# Patient Record
Sex: FEMALE | Race: WHITE | NOT HISPANIC OR LATINO
[De-identification: names, ages, dates, MRNs, and addresses within clinical notes are randomized per-mention and may not be internally consistent; named-entity substitution may affect disease eponyms.]

---

## 2024-07-05 ENCOUNTER — NON-APPOINTMENT (OUTPATIENT)
Age: 59
End: 2024-07-05

## 2024-07-05 ENCOUNTER — APPOINTMENT (OUTPATIENT)
Dept: FAMILY MEDICINE | Facility: CLINIC | Age: 59
End: 2024-07-05
Payer: MEDICAID

## 2024-07-05 VITALS
BODY MASS INDEX: 23.34 KG/M2 | SYSTOLIC BLOOD PRESSURE: 130 MMHG | TEMPERATURE: 97.7 F | HEART RATE: 83 BPM | RESPIRATION RATE: 17 BRPM | WEIGHT: 154 LBS | DIASTOLIC BLOOD PRESSURE: 82 MMHG | HEIGHT: 68 IN | OXYGEN SATURATION: 96 %

## 2024-07-05 DIAGNOSIS — R10.9 UNSPECIFIED ABDOMINAL PAIN: ICD-10-CM

## 2024-07-05 DIAGNOSIS — Z00.00 ENCOUNTER FOR GENERAL ADULT MEDICAL EXAMINATION W/OUT ABNORMAL FINDINGS: ICD-10-CM

## 2024-07-05 DIAGNOSIS — C53.9 MALIGNANT NEOPLASM OF CERVIX UTERI, UNSPECIFIED: ICD-10-CM

## 2024-07-05 DIAGNOSIS — R63.4 ABNORMAL WEIGHT LOSS: ICD-10-CM

## 2024-07-05 DIAGNOSIS — Z82.49 FAMILY HISTORY OF ISCHEMIC HEART DISEASE AND OTHER DISEASES OF THE CIRCULATORY SYSTEM: ICD-10-CM

## 2024-07-05 DIAGNOSIS — F41.9 ANXIETY DISORDER, UNSPECIFIED: ICD-10-CM

## 2024-07-05 DIAGNOSIS — Z80.49 FAMILY HISTORY OF MALIGNANT NEOPLASM OF OTHER GENITAL ORGANS: ICD-10-CM

## 2024-07-05 DIAGNOSIS — Z87.898 PERSONAL HISTORY OF OTHER SPECIFIED CONDITIONS: ICD-10-CM

## 2024-07-05 DIAGNOSIS — Z80.0 FAMILY HISTORY OF MALIGNANT NEOPLASM OF DIGESTIVE ORGANS: ICD-10-CM

## 2024-07-05 PROCEDURE — 99205 OFFICE O/P NEW HI 60 MIN: CPT | Mod: 25

## 2024-07-05 PROCEDURE — 93000 ELECTROCARDIOGRAM COMPLETE: CPT

## 2024-07-05 RX ORDER — ALBUTEROL SULFATE 2.5 MG/3ML
(2.5 MG/3ML) SOLUTION RESPIRATORY (INHALATION)
Refills: 0 | Status: ACTIVE | COMMUNITY
Start: 2024-07-05

## 2024-07-05 RX ORDER — ALPRAZOLAM 0.25 MG/1
0.25 TABLET ORAL
Qty: 14 | Refills: 0 | Status: ACTIVE | COMMUNITY
Start: 2024-07-05 | End: 1900-01-01

## 2024-07-09 ENCOUNTER — NON-APPOINTMENT (OUTPATIENT)
Age: 59
End: 2024-07-09

## 2024-07-09 VITALS — HEIGHT: 68 IN | BODY MASS INDEX: 23.34 KG/M2 | WEIGHT: 154 LBS

## 2024-07-09 DIAGNOSIS — F17.200 NICOTINE DEPENDENCE, UNSPECIFIED, UNCOMPLICATED: ICD-10-CM

## 2024-07-09 DIAGNOSIS — J44.9 CHRONIC OBSTRUCTIVE PULMONARY DISEASE, UNSPECIFIED: ICD-10-CM

## 2024-07-22 ENCOUNTER — TRANSCRIPTION ENCOUNTER (OUTPATIENT)
Age: 59
End: 2024-07-22

## 2024-07-23 ENCOUNTER — TRANSCRIPTION ENCOUNTER (OUTPATIENT)
Age: 59
End: 2024-07-23

## 2024-07-24 RX ORDER — NICOTINE 21 MG/24HR
14 PATCH, TRANSDERMAL 24 HOURS TRANSDERMAL DAILY
Refills: 0 | Status: ACTIVE | COMMUNITY
Start: 2024-07-24

## 2024-07-24 RX ORDER — ALBUTEROL SULFATE 90 UG/1
108 (90 BASE) INHALANT RESPIRATORY (INHALATION)
Qty: 1 | Refills: 3 | Status: ACTIVE | COMMUNITY
Start: 2024-07-24

## 2024-07-24 RX ORDER — FOLIC ACID 1 MG/1
1 TABLET ORAL DAILY
Refills: 0 | Status: ACTIVE | COMMUNITY
Start: 2024-07-24

## 2024-07-24 RX ORDER — LEVOFLOXACIN 750 MG/1
750 TABLET, FILM COATED ORAL DAILY
Refills: 0 | Status: ACTIVE | COMMUNITY
Start: 2024-07-24

## 2024-07-24 RX ORDER — BUDESONIDE AND FORMOTEROL FUMARATE DIHYDRATE 160; 4.5 UG/1; UG/1
160-4.5 AEROSOL RESPIRATORY (INHALATION)
Qty: 1 | Refills: 3 | Status: ACTIVE | COMMUNITY
Start: 2024-07-24

## 2024-07-24 RX ORDER — ELECTROLYTES/DEXTROSE
SOLUTION, ORAL ORAL DAILY
Refills: 0 | Status: ACTIVE | COMMUNITY
Start: 2024-07-24

## 2024-07-26 ENCOUNTER — APPOINTMENT (OUTPATIENT)
Dept: CT IMAGING | Facility: HOSPITAL | Age: 59
End: 2024-07-26

## 2024-07-26 ENCOUNTER — APPOINTMENT (OUTPATIENT)
Dept: ULTRASOUND IMAGING | Facility: HOSPITAL | Age: 59
End: 2024-07-26

## 2024-07-26 PROCEDURE — 71271 CT THORAX LUNG CANCER SCR C-: CPT | Mod: 26

## 2024-07-29 ENCOUNTER — APPOINTMENT (OUTPATIENT)
Dept: FAMILY MEDICINE | Facility: CLINIC | Age: 59
End: 2024-07-29
Payer: MEDICAID

## 2024-07-29 ENCOUNTER — TRANSCRIPTION ENCOUNTER (OUTPATIENT)
Age: 59
End: 2024-07-29

## 2024-07-29 VITALS
RESPIRATION RATE: 16 BRPM | HEART RATE: 92 BPM | TEMPERATURE: 97.1 F | DIASTOLIC BLOOD PRESSURE: 72 MMHG | WEIGHT: 156 LBS | BODY MASS INDEX: 23.64 KG/M2 | HEIGHT: 68 IN | OXYGEN SATURATION: 97 % | SYSTOLIC BLOOD PRESSURE: 122 MMHG

## 2024-07-29 DIAGNOSIS — J18.9 PNEUMONIA, UNSPECIFIED ORGANISM: ICD-10-CM

## 2024-07-29 DIAGNOSIS — J44.9 CHRONIC OBSTRUCTIVE PULMONARY DISEASE, UNSPECIFIED: ICD-10-CM

## 2024-07-29 DIAGNOSIS — K76.0 FATTY (CHANGE OF) LIVER, NOT ELSEWHERE CLASSIFIED: ICD-10-CM

## 2024-07-29 PROCEDURE — 99496 TRANSJ CARE MGMT HIGH F2F 7D: CPT

## 2024-07-29 NOTE — ASSESSMENT
[FreeTextEntry1] : Overall symptoms of pneumonia has been improving.  Continue with ICS/LABA.  To follow-up with pulmonology  Discussed alcohol cessation and healthy lifestyle for treatment of fatty liver.  For further evaluation due to chronic EtOH use will refer to GI to consider FibroScan

## 2024-07-29 NOTE — PHYSICAL EXAM
[No Acute Distress] : no acute distress [Well Nourished] : well nourished [Well Developed] : well developed [Well-Appearing] : well-appearing [Normal Sclera/Conjunctiva] : normal sclera/conjunctiva [PERRL] : pupils equal round and reactive to light [EOMI] : extraocular movements intact [Normal Outer Ear/Nose] : the outer ears and nose were normal in appearance [Normal Oropharynx] : the oropharynx was normal [No JVD] : no jugular venous distention [No Lymphadenopathy] : no lymphadenopathy [Supple] : supple [Thyroid Normal, No Nodules] : the thyroid was normal and there were no nodules present [No Respiratory Distress] : no respiratory distress  [No Accessory Muscle Use] : no accessory muscle use [Clear to Auscultation] : lungs were clear to auscultation bilaterally [Normal Rate] : normal rate  [Regular Rhythm] : with a regular rhythm [Normal S1, S2] : normal S1 and S2 [No Murmur] : no murmur heard [No Carotid Bruits] : no carotid bruits [No Abdominal Bruit] : a ~M bruit was not heard ~T in the abdomen [No Varicosities] : no varicosities [Pedal Pulses Present] : the pedal pulses are present [No Edema] : there was no peripheral edema [No Palpable Aorta] : no palpable aorta [No Extremity Clubbing/Cyanosis] : no extremity clubbing/cyanosis [Soft] : abdomen soft [Non Tender] : non-tender [Non-distended] : non-distended [No Masses] : no abdominal mass palpated [No HSM] : no HSM [Normal Bowel Sounds] : normal bowel sounds [Normal Posterior Cervical Nodes] : no posterior cervical lymphadenopathy [Normal Anterior Cervical Nodes] : no anterior cervical lymphadenopathy [No CVA Tenderness] : no CVA  tenderness [No Spinal Tenderness] : no spinal tenderness [No Joint Swelling] : no joint swelling [Grossly Normal Strength/Tone] : grossly normal strength/tone [No Rash] : no rash [Coordination Grossly Intact] : coordination grossly intact [No Focal Deficits] : no focal deficits [Normal Gait] : normal gait [Deep Tendon Reflexes (DTR)] : deep tendon reflexes were 2+ and symmetric [Normal Affect] : the affect was normal [Normal Insight/Judgement] : insight and judgment were intact [33832 - High Complexity requires an extensive number of possible diagnoses and/or the management options, extensive complexity of the medical data (tests, etc.) to be reviewed, and a high risk of significant complications, morbidity, and/or mortality as w] : High Complexity

## 2024-07-29 NOTE — HISTORY OF PRESENT ILLNESS
[Post-hospitalization from ___ Hospital] : Post-hospitalization from [unfilled] Hospital [Admitted on: ___] : The patient was admitted on [unfilled] [Discharged on ___] : discharged on [unfilled] [Discharge Summary] : discharge summary [Pertinent Labs] : pertinent labs [Radiology Findings] : radiology findings [Discharge Med List] : discharge medication list [Med Reconciliation] : medication reconciliation has been completed [FreeTextEntry2] : Discharge Date 23-Jul-2024 Admission Date 21-Jul-2024 16:20 Reason for Admission aspiration pneumonia, sepsis Hospital Course Hospital Course HPI: 59 yrs old with h/o COPD, current smoker, excessive use of ETOH, came to ER with one day h/o worsening SOB, chest pain radiating to right arm and back with numbness over the left arm. pain is mild to moderate, constant since morning . + cough with greenish sputum. no flu like symptoms. denied presyncope, syncope, dizziness, palpitation. + RUQ abdominal pain for which she was scheduled for RUQ USG on coming friday. no headaches/vision or speech problem, limb weakness. all other ROS neg. patient reported she sued to be on O2 2-4 L PRN in the past. not using it for long time. In ER, VS stable with normal temp/BP/O2 sat. mild tachycardia. wbc elevated. Cr and lactate normal. ABG showed respiratory alkalosis. sepsis protocol was initiated. s/p IVF and Levaquin. CT head neg. CT C/A/P reported no aortic dissection or aneurysm. Small airway disease in the bibasilar lungs, possibly from aspiration. Secretions in the central airways. Moderate centrilobular emphysema. 5 mm nodular opacity at the left lung apex. If the patient is at high risk for primary lung malignancy, follow-up CT chest in one year is optional to ensure stability. medical admission was called for further management. (21 Jul 2024 16:25)  Upon admission, patient was started on Levaquin. SLP was consulted for swallow test, recommended regular diet with thin liquids. MBS performed, negative for aspiration. Lung nodule present on chest CT, patient is aware and will f/u with pulmonologist. Patient with RUQ pain/tenderness. RUQ performed with results shown below. Patient tolerating diet without nausea or vomiting.  < from: US Abdomen Upper Quadrant Right (07.22.24 @ 09:14) >   IMPRESSION: No gallstones. No evidence for gallbladder inflammation. No intra or extrahepatic biliary ductal dilatation.  Hepatic steatosis and mild hepatomegaly.  Trace right pleural effusion incidentally seen.  Pt provided education on alcohol cessation and diet modification for hepatic steatosis.  You will need to follow up with your primary care physician.  Since discharge patient has been feeling fairly well with improving cough.  Reports that shortness of breath is back to baseline.  Started taking ICS/LABA.  Is attempting to make an appointment with pulmonology.  Reviewed imaging with patient including CT of the chest, and ultrasound of the liver.  Left apical 4 mm nodule noted.  Noted steatosis on liver ultrasound.

## 2024-08-05 ENCOUNTER — NON-APPOINTMENT (OUTPATIENT)
Age: 59
End: 2024-08-05

## 2024-08-27 ENCOUNTER — APPOINTMENT (OUTPATIENT)
Dept: PULMONOLOGY | Facility: CLINIC | Age: 59
End: 2024-08-27
Payer: MEDICAID

## 2024-08-27 VITALS
WEIGHT: 155 LBS | HEART RATE: 89 BPM | TEMPERATURE: 97.5 F | SYSTOLIC BLOOD PRESSURE: 126 MMHG | OXYGEN SATURATION: 93 % | DIASTOLIC BLOOD PRESSURE: 78 MMHG | RESPIRATION RATE: 16 BRPM | BODY MASS INDEX: 23.49 KG/M2 | HEIGHT: 68 IN

## 2024-08-27 DIAGNOSIS — J43.2 CENTRILOBULAR EMPHYSEMA: ICD-10-CM

## 2024-08-27 DIAGNOSIS — F17.200 NICOTINE DEPENDENCE, UNSPECIFIED, UNCOMPLICATED: ICD-10-CM

## 2024-08-27 DIAGNOSIS — Z78.9 OTHER SPECIFIED HEALTH STATUS: ICD-10-CM

## 2024-08-27 DIAGNOSIS — R49.0 DYSPHONIA: ICD-10-CM

## 2024-08-27 DIAGNOSIS — R93.89 ABNORMAL FINDINGS ON DIAGNOSTIC IMAGING OF OTHER SPECIFIED BODY STRUCTURES: ICD-10-CM

## 2024-08-27 PROCEDURE — 99205 OFFICE O/P NEW HI 60 MIN: CPT | Mod: 25

## 2024-08-27 PROCEDURE — 99406 BEHAV CHNG SMOKING 3-10 MIN: CPT

## 2024-08-27 RX ORDER — ALBUTEROL SULFATE 2.5 MG/3ML
(2.5 MG/3ML) SOLUTION RESPIRATORY (INHALATION)
Qty: 1 | Refills: 3 | Status: ACTIVE | COMMUNITY
Start: 2024-08-27 | End: 1900-01-01

## 2024-08-27 RX ORDER — ALBUTEROL SULFATE 90 UG/1
108 (90 BASE) AEROSOL, METERED RESPIRATORY (INHALATION)
Qty: 1 | Refills: 6 | Status: ACTIVE | COMMUNITY
Start: 2024-08-27 | End: 1900-01-01

## 2024-08-27 RX ORDER — AZITHROMYCIN 250 MG/1
250 TABLET, FILM COATED ORAL
Qty: 15 | Refills: 1 | Status: ACTIVE | COMMUNITY
Start: 2024-08-27 | End: 1900-01-01

## 2024-08-27 RX ORDER — BUDESONIDE AND FORMOTEROL FUMARATE DIHYDRATE 160; 4.5 UG/1; UG/1
160-4.5 AEROSOL RESPIRATORY (INHALATION)
Refills: 0 | Status: DISCONTINUED | COMMUNITY
End: 2024-08-27

## 2024-08-27 RX ORDER — PREDNISONE 20 MG/1
20 TABLET ORAL DAILY
Qty: 21 | Refills: 1 | Status: ACTIVE | COMMUNITY
Start: 2024-08-27 | End: 1900-01-01

## 2024-08-27 RX ORDER — FLUTICASONE FUROATE, UMECLIDINIUM BROMIDE AND VILANTEROL TRIFENATATE 100; 62.5; 25 UG/1; UG/1; UG/1
100-62.5-25 POWDER RESPIRATORY (INHALATION)
Qty: 3 | Refills: 3 | Status: ACTIVE | COMMUNITY
Start: 2024-08-27 | End: 1900-01-01

## 2024-08-27 NOTE — ASSESSMENT
[FreeTextEntry1] : 60y/o  female  1- current  smoker with hoarseness  2- emphysema 3- ct  [facility] pneumonia 4- cat 5- vaccination  refuses      recommendations 1- d/c symbicort  2- trelegy  100 qd 3- albuteorl MDI  4-  ct f/u  nov     5- PFT 6- ENT   7- smoking cessation discussed in detail   -discussion and   reviewed   ct imaging meds in detail  -walk test

## 2024-08-27 NOTE — HISTORY OF PRESENT ILLNESS
[Current] : current [>= 20 pack years] : >= 20 pack years [Never] : never [TextBox_4] : 8/27/2024 60y/o female  born in [place]   current smoker  ( 14- up to one pack  day  ) h/o  cervical  cancer  ? stage   treated at  27 years old  here OOH   abn ct -cough daily  -  phlegm   green -    now white -symbicort   bid  - albuterol MDI  - aware  of nodule   3 years ago  -   3  mm  now  increased  -    Hospital Course: Discharge Date 23-Jul-2024 Admission Date 21-Jul-2024 16:20 Reason for Admission aspiration pneumonia, sepsis Hospital Course Hospital Course HPI: 59 yrs old with h/o COPD, current smoker, excessive use of ETOH, came to ER with one day h/o worsening SOB, chest pain radiating to right arm and back with numbness over the left arm. pain is mild to moderate, constant since morning . + cough with greenish sputum. no flu like symptoms. denied presyncope, syncope, dizziness, palpitation. + RUQ abdominal pain for which she was scheduled for RUQ USG on coming friday. no headaches/vision or speech problem, limb weakness. all other ROS neg. patient reported she sued to be on O2 2-4 L PRN in the past. not using it for long time. In ER, VS stable with normal temp/BP/O2 sat. mild tachycardia. wbc elevated. Cr and lactate normal. ABG showed respiratory alkalosis. sepsis protocol was initiated. s/p IVF and Levaquin. CT head neg. CT C/A/P reported no aortic dissection or aneurysm. Small airway disease in the bibasilar lungs, possibly from aspiration. Secretions in the central airways. Moderate centrilobular emphysema. 5 mm nodular opacity at the left lung apex. If the patient is at high risk for primary lung malignancy, follow-up CT chest in one year is optional to ensure stability. medical admission was called for further management. (21 Jul 2024 16:25)  Upon admission, patient was started on Levaquin. SLP was consulted for swallow test, recommended regular diet with thin liquids. MBS performed, negative for aspiration. Lung nodule present on chest CT, patient is aware and will f/u with pulmonologist. Patient with RUQ pain/tenderness. RUQ performed with results shown below. Patient tolerating diet without nausea or vomiting. [TextBox_11] : 1 [TextBox_13] : 40

## 2024-08-27 NOTE — REVIEW OF SYSTEMS
[Cough] : cough [Sputum] : sputum [Wheezing] : wheezing [SOB on Exertion] : sob on exertion [Fever] : no fever [Fatigue] : no fatigue [Recent Wt Gain (___ Lbs)] : ~T no recent weight gain [Chills] : no chills [Recent Wt Loss (___ Lbs)] : ~T no recent weight loss [Epistaxis] : no epistaxis [Nasal Congestion] : no nasal congestion [Postnasal Drip] : no postnasal drip [Dry Mouth] : no dry mouth [Sinus Problems] : no sinus problems [Hemoptysis] : no hemoptysis [Dyspnea] : no dyspnea [Chest Discomfort] : no chest discomfort [Palpitations] : no palpitations [GERD] : no gerd [Abdominal Pain] : no abdominal pain [Nausea] : no nausea [Vomiting] : no vomiting [Dysphagia] : no dysphagia [Bleeding] : no bleeding [Chronic Pain] : chronic pain [Blood Transfusion] : no blood transfusion [Clotting Disorder/ Frequent bleeding] : no clotting disorder/ frequent bleeding [Diabetes] : no diabetes [Thyroid Problem] : no thyroid problem [Obesity] : no obesity [TextBox_30] : am  wheezing [TextBox_94] : pressure on   right side   fatty liver ?

## 2024-08-27 NOTE — PHYSICAL EXAM
[III] : Mallampati Class: III [Normal Appearance] : normal appearance [Supple] : supple [No Neck Mass] : no neck mass [No JVD] : no jvd [Normal Rate/Rhythm] : normal rate/rhythm [Normal S1, S2] : normal s1, s2 [No Murmurs] : no murmurs [No Resp Distress] : no resp distress [No Acc Muscle Use] : no acc muscle use [Kyphosis] : kyphosis [Benign] : benign [Not Tender] : not tender [No Masses] : no masses [Soft] : soft [No Hernias] : no hernias [Normal Bowel Sounds] : normal bowel sounds [Normal Gait] : normal gait [No Clubbing] : no clubbing [No Edema] : no edema [No Rash] : no rash [No Motor Deficits] : no motor deficits [Normal Affect] : normal affect [TextBox_2] : pleasant  f   bronchial  cough [TextBox_11] : no lesions   [TextBox_68] : bronchial  cough

## 2024-08-27 NOTE — PROCEDURE
[FreeTextEntry1] : 8/27/2024   resting sats I93%  heart rate   89  walk in hallway   with stairs  up   and down   sats  95%   heart rate   100    MPRESSION: Bilateral lower lung opacities as described above suggestive of a combination of infection with endobronchial spread and atelectasis. 3 month follow-up noncontrast chest CT is recommended to ensure improvement/resolution.  5 mm left upper lobe apical nodule can be monitored on the follow-up chest CT.  LungRADS 0  Recommendation: 3 month Low Dose Chest CT    --- End of Report ---      ANNIE MASON MD; Attending Radiologist This document has been electronically signed. Jul 31 2024 9:55AM

## 2024-08-27 NOTE — HISTORY OF PRESENT ILLNESS
[Current] : current [>= 20 pack years] : >= 20 pack years [Never] : never [TextBox_4] : 8/27/2024 58y/o female  born in [place]   current smoker  ( 16- up to one pack  day  ) h/o  cervical  cancer  ? stage   treated at  27 years old  here OOH   abn ct -cough daily  -  phlegm   green -    now white -symbicort   bid  - albuterol MDI  - aware  of nodule   3 years ago  -   3  mm  now  increased  -    Hospital Course: Discharge Date 23-Jul-2024 Admission Date 21-Jul-2024 16:20 Reason for Admission aspiration pneumonia, sepsis Hospital Course Hospital Course HPI: 59 yrs old with h/o COPD, current smoker, excessive use of ETOH, came to ER with one day h/o worsening SOB, chest pain radiating to right arm and back with numbness over the left arm. pain is mild to moderate, constant since morning . + cough with greenish sputum. no flu like symptoms. denied presyncope, syncope, dizziness, palpitation. + RUQ abdominal pain for which she was scheduled for RUQ USG on coming friday. no headaches/vision or speech problem, limb weakness. all other ROS neg. patient reported she sued to be on O2 2-4 L PRN in the past. not using it for long time. In ER, VS stable with normal temp/BP/O2 sat. mild tachycardia. wbc elevated. Cr and lactate normal. ABG showed respiratory alkalosis. sepsis protocol was initiated. s/p IVF and Levaquin. CT head neg. CT C/A/P reported no aortic dissection or aneurysm. Small airway disease in the bibasilar lungs, possibly from aspiration. Secretions in the central airways. Moderate centrilobular emphysema. 5 mm nodular opacity at the left lung apex. If the patient is at high risk for primary lung malignancy, follow-up CT chest in one year is optional to ensure stability. medical admission was called for further management. (21 Jul 2024 16:25)  Upon admission, patient was started on Levaquin. SLP was consulted for swallow test, recommended regular diet with thin liquids. MBS performed, negative for aspiration. Lung nodule present on chest CT, patient is aware and will f/u with pulmonologist. Patient with RUQ pain/tenderness. RUQ performed with results shown below. Patient tolerating diet without nausea or vomiting. [TextBox_11] : 1 [TextBox_13] : 40

## 2024-09-09 ENCOUNTER — APPOINTMENT (OUTPATIENT)
Dept: FAMILY MEDICINE | Facility: CLINIC | Age: 59
End: 2024-09-09
Payer: MEDICAID

## 2024-09-09 VITALS
DIASTOLIC BLOOD PRESSURE: 90 MMHG | TEMPERATURE: 97.6 F | SYSTOLIC BLOOD PRESSURE: 133 MMHG | HEIGHT: 68 IN | RESPIRATION RATE: 16 BRPM | OXYGEN SATURATION: 97 % | HEART RATE: 74 BPM

## 2024-09-09 VITALS — DIASTOLIC BLOOD PRESSURE: 86 MMHG | SYSTOLIC BLOOD PRESSURE: 128 MMHG

## 2024-09-09 VITALS — WEIGHT: 150 LBS | BODY MASS INDEX: 22.81 KG/M2

## 2024-09-09 DIAGNOSIS — J43.2 CENTRILOBULAR EMPHYSEMA: ICD-10-CM

## 2024-09-09 DIAGNOSIS — J44.9 CHRONIC OBSTRUCTIVE PULMONARY DISEASE, UNSPECIFIED: ICD-10-CM

## 2024-09-09 DIAGNOSIS — F41.9 ANXIETY DISORDER, UNSPECIFIED: ICD-10-CM

## 2024-09-09 DIAGNOSIS — R91.1 SOLITARY PULMONARY NODULE: ICD-10-CM

## 2024-09-09 PROCEDURE — 99214 OFFICE O/P EST MOD 30 MIN: CPT

## 2024-09-09 RX ORDER — ALPRAZOLAM 0.25 MG/1
0.25 TABLET ORAL
Qty: 20 | Refills: 0 | Status: ACTIVE | COMMUNITY
Start: 2024-09-09 | End: 1900-01-01

## 2024-09-17 NOTE — HISTORY OF PRESENT ILLNESS
[de-identified] : 59-year-old female with past medical history of chronic smoker, COPD, recent pneumonia, anxiety, and pulmonary nodule presents for follow-up. Was unable to get in touch with patient regarding lung cancer screening.  Unfortunately patient had lung cancer screening after she was treated for pneumonia during previous hospitalization.  She has followed up with pulmonology.  Unclear if patient started. Trelegy inhaler.  Did receive prednisone.  She reports that she has decreased the amount of cigarettes per day.  Still not ready to quit.  Discussed options for smoking cessation  including double nicotine replacement therapy, and varenicline which she declined.  Overall cough and breathing have been improving.  Reports that anxiety has responded very well to as needed Xanax.  Continues to take infrequently.  Requesting for refill

## 2024-09-17 NOTE — HISTORY OF PRESENT ILLNESS
[de-identified] : 59-year-old female with past medical history of chronic smoker, COPD, recent pneumonia, anxiety, and pulmonary nodule presents for follow-up. Was unable to get in touch with patient regarding lung cancer screening.  Unfortunately patient had lung cancer screening after she was treated for pneumonia during previous hospitalization.  She has followed up with pulmonology.  Unclear if patient started. Trelegy inhaler.  Did receive prednisone.  She reports that she has decreased the amount of cigarettes per day.  Still not ready to quit.  Discussed options for smoking cessation  including double nicotine replacement therapy, and varenicline which she declined.  Overall cough and breathing have been improving.  Reports that anxiety has responded very well to as needed Xanax.  Continues to take infrequently.  Requesting for refill

## 2024-09-17 NOTE — HISTORY OF PRESENT ILLNESS
[de-identified] : 59-year-old female with past medical history of chronic smoker, COPD, recent pneumonia, anxiety, and pulmonary nodule presents for follow-up. Was unable to get in touch with patient regarding lung cancer screening.  Unfortunately patient had lung cancer screening after she was treated for pneumonia during previous hospitalization.  She has followed up with pulmonology.  Unclear if patient started. Trelegy inhaler.  Did receive prednisone.  She reports that she has decreased the amount of cigarettes per day.  Still not ready to quit.  Discussed options for smoking cessation  including double nicotine replacement therapy, and varenicline which she declined.  Overall cough and breathing have been improving.  Reports that anxiety has responded very well to as needed Xanax.  Continues to take infrequently.  Requesting for refill

## 2024-09-17 NOTE — PLAN
[FreeTextEntry1] : Reviewed previous CT of chest.  Patient will need repeat which was ordered by pulmonology.  Overall symptoms of shortness of breath and wheezing has been improving.  Will renew albuterol and Trelegy for patient.  Stressed importance of smoking cessation with the patient has declined.  Patient has continued decrease amount of tobacco that she is smoking daily.  Receptive to bone density and mammogram which was ordered  Patient has been taking Xanax sparingly for situational anxiety with moderate relief.  Will continue to renew if patient takes conservative amounts.  Currently declines further treatment or CBT therapy